# Patient Record
Sex: MALE | Race: WHITE
[De-identification: names, ages, dates, MRNs, and addresses within clinical notes are randomized per-mention and may not be internally consistent; named-entity substitution may affect disease eponyms.]

---

## 2019-12-04 ENCOUNTER — HOSPITAL ENCOUNTER (INPATIENT)
Dept: HOSPITAL 74 - YASAS | Age: 34
LOS: 4 days | Discharge: HOME | DRG: 773 | End: 2019-12-08
Attending: ALLERGY & IMMUNOLOGY | Admitting: ALLERGY & IMMUNOLOGY
Payer: SELF-PAY

## 2019-12-04 VITALS — BODY MASS INDEX: 25.7 KG/M2

## 2019-12-04 DIAGNOSIS — F11.23: Primary | ICD-10-CM

## 2019-12-04 DIAGNOSIS — F14.20: ICD-10-CM

## 2019-12-04 DIAGNOSIS — F19.282: ICD-10-CM

## 2019-12-04 DIAGNOSIS — F17.210: ICD-10-CM

## 2019-12-04 DIAGNOSIS — J45.20: ICD-10-CM

## 2019-12-04 PROCEDURE — HZ2ZZZZ DETOXIFICATION SERVICES FOR SUBSTANCE ABUSE TREATMENT: ICD-10-PCS | Performed by: ALLERGY & IMMUNOLOGY

## 2019-12-04 NOTE — HP
COWS





- Scale


Resting Pulse: 0= [place] 80 or Below


Sweatin= Chills/Flushing


Restless Observation: 3= Extraneous Movement


Pupil Size: 0= Normal to Room Light


Bone or Joint Aches: 2= Severe Diffuse Aches


Runny Nose/ Eye Tearin= Nasal Congestion


GI Upset > 30mins: 2= Nausea/Diarrhea


Tremor Observation: 0= None


Yawning Observation: 0= None


Anxiety or Irritability: 2=Irritable/Anxious


Goose Flesh Skin: 0=Smooth Skin


COWS Score: 11





CIWA Score





- Admission Criteria


[facility] Guidelines: Admission for Medically Managed Detox: 


Requires at least one of the followin. CIWA greater than 12


2. Seizures within the past 24 hours


3. Delirium tremens within the past 24 hours


4. Hallucinations within the past 24 hours


5. Acute intervention needed for co  occurring medical disorder


6. Acute intervention needed for co  occurring psychiatric disorder


7. Severe withdrawal that cannot be handled at a lower level of care (continued


    vomiting, continued diarrhea, abnormal vital signs) requiring intravenous


    medication and/or fluids


8. Pregnancy








Admitting History and Physical





- Smoking History


Smoking history: Current every day smoker


Have you smoked in the past 12 months: Yes


Aproximately how many cigarettes per day: 20





- Alcohol/Substance Use


Hx Alcohol Use: No





Admission ROS [facility]





- [facility]


Allergies/Adverse Reactions: 


 Allergies











Allergy/AdvReac Type Severity Reaction Status Date / Time


 


No Known Allergies Allergy   Verified 19 21:57











History of Present Illness: 





earch Terms: reynaldo reyes poonam, 1985


Search Date: 2019 10:35:07 PM





The Drug Utilization Report below displays all of the controlled substance 

prescriptions, if any, that your patient has filled in the last twelve months. 

The information displayed on this report is compiled from pharmacy submissions 

to the Department, and accurately reflects the information as submitted by the 

pharmacies.





This report was requested by: Delmi Terry | Reference #: [id number]





There are no results for the search terms that you entered.





pt here requesting detox from opiate  use  , reports he was sent  by probation  

2 + utox , on probation x  3  years  2/2  drug-related charges  (  possession 

cocaine and heroin ) , started probation 2  weeks ago  . 


first  used heroin 5  years ago , current daily use  8 bags/day  via  

inhalation ,denies OD , latest use  today  5  pm . 


cocaine : crack  2  bags/day  


methadone  : 20  mg  illicit  use  claims 2  weeks ago 


denies etoh  


denies  other illicits  


tobacco : / ppd    


PMHX :asthma  , intubated July 2019 x 5 days  


PSHx :  denies  


PSych: insomnia  


2 children ages 12, 13  live in [place]  w/ bio mother  


Exam Limitations: Clinical Condition





- Ebola screening


Have you traveled outside of the country in the last 21 days: No (N)


Have you had contact with anyone from an Ebola affected area: No


Do you have a fever: No





- Review of Systems


Constitutional: Loss of Appetite


EENT: reports: Other (denies dysphagia)


Respiratory: reports: See HPI


Cardiac: reports: No Symptoms Reported


GI: reports: See HPI


: reports: Other (difficulty  urinating when using  opiates)


Musculoskeletal: reports: See HPI


Integumentary: reports: No Symptoms Reported


Neuro: reports: Headache


Endocrine: reports: No Symptoms Reported


Hematology: reports: No Symptoms Reported


Psychiatric: reports: Orientated x3, Agitated, Anxious





Patient History





- Patient Medical History


Hx Anemia: No


Hx Asthma: Yes


Hx Chronic Obstructive Pulmonary Disease (COPD): No


Hx Cancer: No


Hx Cardiac Disorders: No


Hx Congestive Heart Failure: No


Hx Hypertension: No


Hx Hypercholesterolemia: No


Hx Pacemaker: No


HX Cerebrovascular Accident: No


Hx Seizures: No


Hx Dementia: No


Hx Diabetes: No


Hx Gastrointestinal Disorders: No


Hx Liver Disease: No


Hx Genitourinary Disorders: No


Hx Sexually Transmitted Disorders: No


Hx Renal Disease (ESRD): No


Hx Thyroid Disease: No


Hx Human Immunodeficiency Virus (HIV): No


Hx Hepatitis C: No


Hx Depression: No


Hx Suicide Attempt: No


Hx Bipolar Disorder: No


Hx Schizophrenia: No





- Patient Surgical History


Past Surgical History: No





- PPD History


Date: 17





- Smoking Cessation


Smoking history: Current every day smoker


Have you smoked in the past 12 months: Yes


Aproximately how many cigarettes per day: 20


Hx Chewing Tobacco Use: No


Initiated information on smoking cessation: Yes


'Breaking Loose' booklet given: 19





- Substances abused


  ** Heroin


Substance route: Inhalation


Frequency: Daily


Amount used: 8 BAGS


Age of first use: 28


Date of last use: 19





  ** Crack


Substance route: Smoking


Frequency: Daily


Amount used: 2 BAGS


Age of first use: 33


Date of last use: 19





Admission Physical Exam [facility]





- Vital Signs


Vital Signs: 


 Vital Signs - 24 hr











  19





  21:56


 


Temperature 99.5 F


 


Pulse Rate 78


 


Respiratory 18





Rate 


 


Blood Pressure 114/64














- Physical


General Appearance: Yes: Mild Distress, Anxious


HEENTM: Yes: EOMI, Hearing grossly Normal, Normocephalic, Normal Voice


Respiratory: Yes: Chest Non-Tender, Lungs Clear, Normal Breath Sounds, No 

Respiratory Distress, No Accessory Muscle Use


Neck: Yes: No masses,lesions,Nodules, Trachea in good position


Cardiology: Yes: Regular Rhythm, Regular Rate, S1, S2


Abdominal: Yes: Non Tender, Soft


Back: Yes: Normal Inspection


Musculoskeletal: Yes: Gait Steady


Extremities: Yes: Normal Inspection, Normal Range of Motion, Non-Tender


Neurological: Yes: Fully Oriented, Alert, Motor Strength 5/5, Normal Mood/Affect


Integumentary: Yes: Warm





- Diagnostic


(1) Cocaine dependence


Current Visit: Yes   Status: Chronic   


Qualifiers: 


   Substance use status: uncomplicated   Qualified Code(s): F14.20 - Cocaine 

dependence, uncomplicated   





(2) Nicotine dependence


Current Visit: Yes   Status: Chronic   


Qualifiers: 


   Nicotine product type: cigarettes   Substance use status: uncomplicated   

Qualified Code(s): F17.210 - Nicotine dependence, cigarettes, uncomplicated   





(3) Opioid dependence with withdrawal


Current Visit: Yes   Status: Chronic   





Breathalyzer





- Breathalyzer


Breathalyzer: 0





Urine Drug Screen





- Test Device


Lot number: [id number]


Expiration date: 21





- Control


Is test valid?: Yes





- Results


Drug screen NEGATIVE: No


Urine drug screen results: DONALD-Cocaine, FEN-Fentanyl, MOP-Opiates, MTD-Methadone





Inpatient Rehab Admission





- Rehab Decision to Admit


Inpatient rehab admission?: No

## 2019-12-05 LAB
ALBUMIN SERPL-MCNC: 3 G/DL (ref 3.4–5)
ALP SERPL-CCNC: 93 U/L (ref 45–117)
ALT SERPL-CCNC: 16 U/L (ref 13–61)
ANION GAP SERPL CALC-SCNC: 6 MMOL/L (ref 8–16)
AST SERPL-CCNC: 10 U/L (ref 15–37)
BILIRUB SERPL-MCNC: 0.2 MG/DL (ref 0.2–1)
BUN SERPL-MCNC: 12.6 MG/DL (ref 7–18)
CALCIUM SERPL-MCNC: 8.3 MG/DL (ref 8.5–10.1)
CHLORIDE SERPL-SCNC: 106 MMOL/L (ref 98–107)
CO2 SERPL-SCNC: 27 MMOL/L (ref 21–32)
CREAT SERPL-MCNC: 0.8 MG/DL (ref 0.55–1.3)
DEPRECATED RDW RBC AUTO: 14.1 % (ref 11.9–15.9)
GLUCOSE SERPL-MCNC: 105 MG/DL (ref 74–106)
HCT VFR BLD CALC: 42.8 % (ref 35.4–49)
HGB BLD-MCNC: 14.1 GM/DL (ref 11.7–16.9)
MCH RBC QN AUTO: 29.1 PG (ref 25.7–33.7)
MCHC RBC AUTO-ENTMCNC: 33 G/DL (ref 32–35.9)
MCV RBC: 88.2 FL (ref 80–96)
PLATELET # BLD AUTO: 303 K/MM3 (ref 134–434)
PMV BLD: 7.6 FL (ref 7.5–11.1)
POTASSIUM SERPLBLD-SCNC: 4.2 MMOL/L (ref 3.5–5.1)
PROT SERPL-MCNC: 6.2 G/DL (ref 6.4–8.2)
RBC # BLD AUTO: 4.86 M/MM3 (ref 4–5.6)
SODIUM SERPL-SCNC: 139 MMOL/L (ref 136–145)
WBC # BLD AUTO: 6.5 K/MM3 (ref 4–10)

## 2019-12-05 RX ADMIN — Medication SCH TAB: at 09:38

## 2019-12-05 RX ADMIN — Medication SCH MG: at 22:01

## 2019-12-05 RX ADMIN — Medication PRN MG: at 22:02

## 2019-12-05 NOTE — PN
[facility] COWS





- Scale


Resting Pulse: 0= [place] 80 or Below


Sweatin= No chills or Flushing


Restless Observation: 1= Difficult to Sit Still


Pupil Size: 1= Pupils >than Normal


Bone or Joint Aches: 2= Severe Diffuse Aches


Runny Nose/ Eye Tearin= Runny Nose/Eyes


GI Upset > 30mins: 2= Nausea/Diarrhea


Tremor Observation of Outstretched Hands: 2= Slight Tremor Visible


Yawning Observation: 1= 1-2x During Session


Anxiety or Irritability: 2=Irritable/Anxious


Goose Flesh Skin: 0=Smooth Skin


COWS Score: 13





[facility] Progress Note (SOAP)


Subjective: 





alert,irritable,anxious,interrupted sleep,tremor,pain in the body and back


Objective: 





19 11:47


 Vital Signs











Temperature  97.8 F   19 09:33


 


Pulse Rate  69   19 09:33


 


Respiratory Rate  18   19 09:33


 


Blood Pressure  119/74   19 09:33


 


O2 Sat by Pulse Oximetry (%)      








 Laboratory Last Values











WBC  6.5 K/mm3 (4.0-10.0)   19  08:00    


 


RBC  4.86 M/mm3 (4.00-5.60)   19  08:00    


 


Hgb  14.1 GM/dL (11.7-16.9)   19  08:00    


 


Hct  42.8 % (35.4-49)   19  08:00    


 


MCV  88.2 fl (80-96)   19  08:00    


 


MCH  29.1 pg (25.7-33.7)   19  08:00    


 


MCHC  33.0 g/dl (32.0-35.9)   19  08:00    


 


RDW  14.1 % (11.9-15.9)   19  08:00    


 


Plt Count  303 K/MM3 (134-434)   19  08:00    


 


MPV  7.6 fl (7.5-11.1)   19  08:00    


 


Sodium  139 mmol/L (136-145)   19  08:00    


 


Potassium  4.2 mmol/L (3.5-5.1)   19  08:00    


 


Chloride  106 mmol/L ()   19  08:00    


 


Carbon Dioxide  27 mmol/L (21-32)   19  08:00    


 


Anion Gap  6 MMOL/L (8-16)  L  19  08:00    


 


BUN  12.6 mg/dL (7-18)   19  08:00    


 


Creatinine  0.8 mg/dL (0.55-1.3)   19  08:00    


 


Est GFR (CKD-EPI)AfAm  135.08   19  08:00    


 


Est GFR (CKD-EPI)NonAf  116.55   19  08:00    


 


Random Glucose  105 mg/dL ()   19  08:00    


 


Calcium  8.3 mg/dL (8.5-10.1)  L  19  08:00    


 


Total Bilirubin  0.2 mg/dL (0.2-1)   19  08:00    


 


AST  10 U/L (15-37)  L  19  08:00    


 


ALT  16 U/L (13-61)   19  08:00    


 


Alkaline Phosphatase  93 U/L ()   19  08:00    


 


Total Protein  6.2 g/dl (6.4-8.2)  L  19  08:00    


 


Albumin  3.0 g/dl (3.4-5.0)  L  19  08:00    


 


RPR Titer  Nonreactive  (NONREACTIVE)   19  08:00    











Assessment: 





19 11:48


withdrawal symptom


Plan: 





continue detox methadone regimen,valium prn for 72 hrs
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
## 2019-12-05 NOTE — EKG
Test Reason : 

Blood Pressure : ***/*** mmHG

Vent. Rate : 053 BPM     Atrial Rate : 053 BPM

   P-R Int : 152 ms          QRS Dur : 098 ms

    QT Int : 444 ms       P-R-T Axes : 055 065 055 degrees

   QTc Int : 416 ms

 

SINUS BRADYCARDIA

OTHERWISE NORMAL ECG

WHEN COMPARED WITH ECG OF 05-DEC-2019 00:38,

NO SIGNIFICANT CHANGE WAS FOUND

Confirmed by MATTEO MARIN, TITO ([id number]) on 12/5/2019 10:44:49 AM

 

Referred By: Nas Land           Confirmed By:TITO FELIPE MD

## 2019-12-06 RX ADMIN — Medication PRN MG: at 22:07

## 2019-12-06 RX ADMIN — Medication SCH MG: at 22:07

## 2019-12-06 RX ADMIN — Medication SCH TAB: at 10:21

## 2019-12-06 NOTE — PN
[facility] COWS





- Scale


Resting Pulse: 0= [place] 80 or Below


Sweatin= No chills or Flushing


Restless Observation: 1= Difficult to Sit Still


Pupil Size: 1= Pupils >than Normal


Bone or Joint Aches: 1= Mild Discomfort


Runny Nose/ Eye Tearin= Nasal Congestion


GI Upset > 30mins: 1= Stomach Cramp


Tremor Observation of Outstretched Hands: 2= Slight Tremor Visible


Yawning Observation: 1= 1-2x During Session


Anxiety or Irritability: 2=Irritable/Anxious


Goose Flesh Skin: 0=Smooth Skin


COWS Score: 10





[facility] Progress Note (SOAP)


Subjective: 





alert,irritable,anxious,interrupted sleep,pain in the body,nausea


Objective: 





19 10:07


 Vital Signs











Temperature  98.1 F   19 09:44


 


Pulse Rate  73   19 09:44


 


Respiratory Rate  18   19 09:44


 


Blood Pressure  151/78   19 09:44


 


O2 Sat by Pulse Oximetry (%)      








 Vital Signs











Temperature  98.1 F   19 09:44


 


Pulse Rate  73   19 09:44


 


Respiratory Rate  18   19 09:44


 


Blood Pressure  151/78   19 09:44


 


O2 Sat by Pulse Oximetry (%)      








 Laboratory Last Values











WBC  6.5 K/mm3 (4.0-10.0)   19  08:00    


 


RBC  4.86 M/mm3 (4.00-5.60)   19  08:00    


 


Hgb  14.1 GM/dL (11.7-16.9)   19  08:00    


 


Hct  42.8 % (35.4-49)   19  08:00    


 


MCV  88.2 fl (80-96)   19  08:00    


 


MCH  29.1 pg (25.7-33.7)   19  08:00    


 


MCHC  33.0 g/dl (32.0-35.9)   19  08:00    


 


RDW  14.1 % (11.9-15.9)   19  08:00    


 


Plt Count  303 K/MM3 (134-434)   19  08:00    


 


MPV  7.6 fl (7.5-11.1)   19  08:00    


 


Sodium  139 mmol/L (136-145)   19  08:00    


 


Potassium  4.2 mmol/L (3.5-5.1)   19  08:00    


 


Chloride  106 mmol/L ()   19  08:00    


 


Carbon Dioxide  27 mmol/L (21-32)   19  08:00    


 


Anion Gap  6 MMOL/L (8-16)  L  19  08:00    


 


BUN  12.6 mg/dL (7-18)   19  08:00    


 


Creatinine  0.8 mg/dL (0.55-1.3)   19  08:00    


 


Est GFR (CKD-EPI)AfAm  135.08   19  08:00    


 


Est GFR (CKD-EPI)NonAf  116.55   19  08:00    


 


Random Glucose  105 mg/dL ()   19  08:00    


 


Calcium  8.3 mg/dL (8.5-10.1)  L  19  08:00    


 


Total Bilirubin  0.2 mg/dL (0.2-1)   19  08:00    


 


AST  10 U/L (15-37)  L  19  08:00    


 


ALT  16 U/L (13-61)   19  08:00    


 


Alkaline Phosphatase  93 U/L ()   19  08:00    


 


Total Protein  6.2 g/dl (6.4-8.2)  L  19  08:00    


 


Albumin  3.0 g/dl (3.4-5.0)  L  19  08:00    


 


RPR Titer  Nonreactive  (NONREACTIVE)   19  08:00    











Assessment: 





19 10:08


withdrawal symptom


Plan: 





continue detox methadone regimen and valium prn

## 2019-12-07 VITALS — HEART RATE: 68 BPM

## 2019-12-07 RX ADMIN — Medication SCH MG: at 22:12

## 2019-12-07 RX ADMIN — Medication SCH TAB: at 10:22

## 2019-12-07 RX ADMIN — Medication PRN MG: at 22:12

## 2019-12-07 NOTE — PN
[facility] COWS





- Scale


Resting Pulse: 0= [place] 80 or Below


Sweatin= Chills/Flushing


Restless Observation: 1= Difficult to Sit Still


Pupil Size: 0= Normal to Room Light


Bone or Joint Aches: 2= Severe Diffuse Aches


Runny Nose/ Eye Tearin= None


GI Upset > 30mins: 0= None


Tremor Observation of Outstretched Hands: 2= Slight Tremor Visible


Yawning Observation: 1= 1-2x During Session


Anxiety or Irritability: 2=Irritable/Anxious


Goose Flesh Skin: 0=Smooth Skin


COWS Score: 9





[facility] Progress Note (SOAP)


Subjective: 





Sweating, Body Aches Tremors, Chills.


Objective: 


PATIENT A & O X 3, OBSERVED AMBULATING ON DETOX UNIT UNASSISTED. IN NO ACUTE 

DISTRESS.





19 16:18


 Vital Signs











Temperature  97.5 F L  19 13:42


 


Pulse Rate  76   19 13:42


 


Respiratory Rate  18   19 13:42


 


Blood Pressure  135/73   19 13:42


 


O2 Sat by Pulse Oximetry (%)      











 Laboratory Tests











  19





  08:00 08:00 08:00


 


WBC  6.5  


 


RBC  4.86  


 


Hgb  14.1  


 


Hct  42.8  


 


MCV  88.2  


 


MCH  29.1  


 


MCHC  33.0  


 


RDW  14.1  


 


Plt Count  303  


 


MPV  7.6  


 


Sodium   139 


 


Potassium   4.2 


 


Chloride   106 


 


Carbon Dioxide   27 


 


Anion Gap   6 L 


 


BUN   12.6 


 


Creatinine   0.8 


 


Est GFR (CKD-EPI)AfAm   135.08 


 


Est GFR (CKD-EPI)NonAf   116.55 


 


Random Glucose   105 


 


Calcium   8.3 L 


 


Total Bilirubin   0.2 


 


AST   10 L 


 


ALT   16 


 


Alkaline Phosphatase   93 


 


Total Protein   6.2 L 


 


Albumin   3.0 L 


 


RPR Titer    Nonreactive











LABS NOTED.


Assessment: 





19 16:19


WITHDRAWAL SYMPTOMS.


Plan: 





CONTINUE DETOX.


INCREASE DAILY ORAL WATER INTAKE.

## 2019-12-08 VITALS — TEMPERATURE: 98.1 F | SYSTOLIC BLOOD PRESSURE: 95 MMHG | DIASTOLIC BLOOD PRESSURE: 64 MMHG

## 2019-12-08 RX ADMIN — Medication SCH TAB: at 10:28

## 2019-12-08 NOTE — DS
[facility] Detox Discharge Summary


Admission Date: 


12/04/19





Discharge Date: 12/08/19





- History


Present History: Cocaine Dependence, Opioid Dependence


Additional Comments: 





Patient demanded to leave AMA despite encouragement to complete detox. Patient 

instructed to call 911 ASAP if sick or withdrawal sxs and to see his PCP within 

3 days. Patient verbalized understanding and stated that he is feeling fine.


Pertinent Past History: 





Asthma





- Physical Exam Results


Vital Signs: 


 Vital Signs











Temperature  98.1 F   12/08/19 07:16


 


Pulse Rate  68   12/08/19 07:16


 


Respiratory Rate  18   12/08/19 07:16


 


Blood Pressure  95/64   12/08/19 07:16


 


O2 Sat by Pulse Oximetry (%)      











Pertinent Admission Physical Exam Findings: 





Withdrawal sxs





 Laboratory Tests











  12/05/19 12/05/19 12/05/19





  08:00 08:00 08:00


 


WBC  6.5  


 


RBC  4.86  


 


Hgb  14.1  


 


Hct  42.8  


 


MCV  88.2  


 


MCH  29.1  


 


MCHC  33.0  


 


RDW  14.1  


 


Plt Count  303  


 


MPV  7.6  


 


Sodium   139 


 


Potassium   4.2 


 


Chloride   106 


 


Carbon Dioxide   27 


 


Anion Gap   6 L 


 


BUN   12.6 


 


Creatinine   0.8 


 


Est GFR (CKD-EPI)AfAm   135.08 


 


Est GFR (CKD-EPI)NonAf   116.55 


 


Random Glucose   105 


 


Calcium   8.3 L 


 


Total Bilirubin   0.2 


 


AST   10 L 


 


ALT   16 


 


Alkaline Phosphatase   93 


 


Total Protein   6.2 L 


 


Albumin   3.0 L 


 


RPR Titer    Nonreactive








Labs reviewed





- Medication


Discharge Medications: 


Ambulatory Orders





Albuterol Sulfate Inhaler - [Ventolin HFA Inhaler -] 1 - 2 puff IH PRN PRN 11/25 /19 











- Diagnosis


(1) Cocaine dependence


Status: Chronic   


Qualifiers: 


   Substance use status: uncomplicated   Qualified Code(s): F14.20 - Cocaine 

dependence, uncomplicated   





(2) Opioid dependence with withdrawal


Status: Acute   





(3) Asthma


Status: Chronic   


Qualifiers: 


   Asthma severity: mild intermittent   Asthma complication type: uncomplicated

   Qualified Code(s): J45.20 - Mild intermittent asthma, uncomplicated   





(4) Nicotine dependence


Status: Chronic   


Qualifiers: 


   Nicotine product type: cigarettes   Substance use status: uncomplicated   

Qualified Code(s): F17.210 - Nicotine dependence, cigarettes, uncomplicated   





- AMA


Did Patient Leave Against Medical Advice: Yes (Instructed to call 911 ASAP if 

sick/withdrawal sxs)
Statement Selected

## 2021-09-11 ENCOUNTER — HOSPITAL ENCOUNTER (INPATIENT)
Dept: HOSPITAL 74 - YASAS | Age: 36
LOS: 3 days | Discharge: LEFT BEFORE DISCHARGE | DRG: 770 | End: 2021-09-14
Attending: ALLERGY & IMMUNOLOGY | Admitting: ALLERGY & IMMUNOLOGY
Payer: SELF-PAY

## 2021-09-11 VITALS — BODY MASS INDEX: 22.1 KG/M2

## 2021-09-11 DIAGNOSIS — F41.9: ICD-10-CM

## 2021-09-11 DIAGNOSIS — J45.20: ICD-10-CM

## 2021-09-11 DIAGNOSIS — F17.210: ICD-10-CM

## 2021-09-11 DIAGNOSIS — G47.00: ICD-10-CM

## 2021-09-11 DIAGNOSIS — F11.23: Primary | ICD-10-CM

## 2021-09-11 DIAGNOSIS — F14.20: ICD-10-CM

## 2021-09-11 DIAGNOSIS — F10.230: ICD-10-CM

## 2021-09-11 DIAGNOSIS — F12.20: ICD-10-CM

## 2021-09-11 DIAGNOSIS — J20.9: ICD-10-CM

## 2021-09-11 PROCEDURE — C9803 HOPD COVID-19 SPEC COLLECT: HCPCS

## 2021-09-11 PROCEDURE — U0005 INFEC AGEN DETEC AMPLI PROBE: HCPCS

## 2021-09-11 PROCEDURE — U0003 INFECTIOUS AGENT DETECTION BY NUCLEIC ACID (DNA OR RNA); SEVERE ACUTE RESPIRATORY SYNDROME CORONAVIRUS 2 (SARS-COV-2) (CORONAVIRUS DISEASE [COVID-19]), AMPLIFIED PROBE TECHNIQUE, MAKING USE OF HIGH THROUGHPUT TECHNOLOGIES AS DESCRIBED BY CMS-2020-01-R: HCPCS

## 2021-09-11 RX ADMIN — HYDROXYZINE PAMOATE PRN MG: 25 CAPSULE ORAL at 23:57

## 2021-09-12 PROCEDURE — HZ2ZZZZ DETOXIFICATION SERVICES FOR SUBSTANCE ABUSE TREATMENT: ICD-10-PCS | Performed by: ALLERGY & IMMUNOLOGY

## 2021-09-12 RX ADMIN — NICOTINE SCH MG: 14 PATCH, EXTENDED RELEASE TRANSDERMAL at 11:37

## 2021-09-12 RX ADMIN — Medication SCH MG: at 23:41

## 2021-09-12 RX ADMIN — Medication SCH TAB: at 11:37

## 2021-09-13 LAB
ALBUMIN SERPL-MCNC: 3.6 G/DL (ref 3.4–5)
ALP SERPL-CCNC: 76 U/L (ref 45–117)
ALT SERPL-CCNC: 18 U/L (ref 13–61)
ANION GAP SERPL CALC-SCNC: 7 MMOL/L (ref 8–16)
AST SERPL-CCNC: 13 U/L (ref 15–37)
BILIRUB SERPL-MCNC: 0.8 MG/DL (ref 0.2–1)
BUN SERPL-MCNC: 8.2 MG/DL (ref 7–18)
CALCIUM SERPL-MCNC: 9.2 MG/DL (ref 8.5–10.1)
CHLORIDE SERPL-SCNC: 105 MMOL/L (ref 98–107)
CO2 SERPL-SCNC: 27 MMOL/L (ref 21–32)
CREAT SERPL-MCNC: 0.9 MG/DL (ref 0.55–1.3)
DEPRECATED RDW RBC AUTO: 14.9 % (ref 11.9–15.9)
GLUCOSE SERPL-MCNC: 96 MG/DL (ref 74–106)
HCT VFR BLD CALC: 51 % (ref 35.4–49)
HGB BLD-MCNC: 17.1 GM/DL (ref 11.7–16.9)
MCH RBC QN AUTO: 28.1 PG (ref 25.7–33.7)
MCHC RBC AUTO-ENTMCNC: 33.5 G/DL (ref 32–35.9)
MCV RBC: 83.9 FL (ref 80–96)
PLATELET # BLD AUTO: 316 10^3/UL (ref 134–434)
PMV BLD: 7.6 FL (ref 7.5–11.1)
PROT SERPL-MCNC: 7.5 G/DL (ref 6.4–8.2)
RBC # BLD AUTO: 6.08 M/MM3 (ref 4–5.6)
SODIUM SERPL-SCNC: 139 MMOL/L (ref 136–145)
WBC # BLD AUTO: 13.4 K/MM3 (ref 4–10)

## 2021-09-13 RX ADMIN — Medication SCH MG: at 22:32

## 2021-09-13 RX ADMIN — Medication SCH TAB: at 10:46

## 2021-09-13 RX ADMIN — Medication SCH MG: at 22:31

## 2021-09-13 RX ADMIN — NICOTINE SCH MG: 14 PATCH, EXTENDED RELEASE TRANSDERMAL at 10:46

## 2021-09-13 RX ADMIN — HYDROXYZINE PAMOATE PRN MG: 25 CAPSULE ORAL at 22:32

## 2021-09-13 RX ADMIN — HYDROXYZINE PAMOATE PRN MG: 25 CAPSULE ORAL at 10:46

## 2021-09-14 VITALS — SYSTOLIC BLOOD PRESSURE: 113 MMHG | HEART RATE: 74 BPM | DIASTOLIC BLOOD PRESSURE: 65 MMHG | TEMPERATURE: 97.8 F
